# Patient Record
(demographics unavailable — no encounter records)

---

## 2018-11-28 NOTE — CT REPORT
Reason:  GROSS HEMATURIA

Procedure Date:  11/28/2018   

Accession Number:  [id number] / [id number]                    

Procedure:  CT  - IVP CPT Code:  

 

FULL RESULT:

 

 

EXAM:

CT ABDOMEN AND PELVIS WITHOUT AND WITH CONTRAST (CT IVP)

 

EXAM DATE: 11/28/2018 07:31 AM.

 

CLINICAL HISTORY: Gross hematuria.

 

COMPARISONS: None.

 

TECHNIQUE: Routine helical imaging was performed through the kidneys, 

ureters and bladder in the precontrast, postcontrast and delayed phase. 

IV Contrast: ISOVUE 320  100mL. Reconstructions: Coronal and sagittal.

 

In accordance with CT protocol optimization, one or more of the following 

dose reduction techniques were utilized for this exam: automated exposure 

control, adjustment of mA and/or KV based on patient size, or use of 

iterative reconstructive technique.

 

FINDINGS:

Lung Bases: Unremarkable.

 

Right Kidney/Ureter: 6 cm right upper pole simple renal cyst. No stones, 

hydronephrosis, or solid masses.

 

Left Kidney/Ureter: No stones, hydronephrosis, or masses.

 

Other Solid Organs: There are multiple bilateral hypodense liver lesions, 

the largest of which demonstrates An Irregular lobulated margin with an 

almost nodular appearance. While this is not diagnostic, this is a 

relatively typical appearance of hemangioma in the late contrast phase 

presented here. Other hepatic hypodensities are too small to 

characterize. The spleen, pancreas, gallbladder, and adrenal glands are 

unremarkable.The bile ducts are unremarkable.

 

Peritoneal Cavity/Bowel: Normal. No free fluid, free air or adenopathy. 

No masses.  Bowel loops are unremarkable.

 

Pelvic Organs: Fiducial markers are noted in the prostate. No bladder 

stones, obstruction or masses.

 

Vasculature: Mild atherosclerosis.

 

Bones: No aggressive osseous lesions.

 

Other: None.

IMPRESSION: Normal CT IVP. No suspicious urinary tract masses, stones or 

obstruction.

 

Uncharacterized hepatic hypodensities. For definitive characterization, 

CT multiphase liver protocol or MRI multiphase liver protocol can be 

considered.

 

[facility]

## 2019-03-20 NOTE — ULTRASOUND REPORT
Reason:  HISTORY OF NEPHROLITHIASIS

Procedure Date:  03/20/2019   

Accession Number:  [id number] / [id number]                    

Procedure:  US  - Retroperitoneal CPT Code:  

 

FULL RESULT:

 

 

EXAM:

RENAL ULTRASOUND

 

EXAM DATE: 3/20/2019 08:00 AM.

 

CLINICAL HISTORY: History of nephrolithiasis.

 

COMPARISON: IVP 11/28/2018 7:31 AM.

 

TECHNIQUE: Real-time scanning was performed with static images obtained.

 

FINDINGS:

Right Kidney: 12.1 x 5.5 x 4.9. cm. Normal echotexture with no stones, 

contour-deforming masses, or hydronephrosis. Exophytic thin-walled 

anechoic simple cyst measuring 7.9 cm off the superior pole, little 

changed compared to comparison CT.

 

Left Kidney: 11.8 x 5.2 x 5.6. cm. Normal echotexture with no 

contour-deforming masses or hydronephrosis. 1 cm exophytic 

simple-appearing cyst upper pole. 4 mm nonshadowing midpole stone.

 

Bladder: Bilateral jets seen. The prevoid bladder volume was 81.3 cc. The 

postvoid bladder volume was 59.5 cc.

 

Other: None.

IMPRESSION:

1. Stable appearance of large simple cyst of the upper pole right kidney.

2. Nonobstructive left mid pole nephrolithiasis.

3. Small to moderate postvoiding residual.

 

[facility]

## 2019-08-17 NOTE — XRAY REPORT
Reason:  KNEE PAIN,RT

Procedure Date:  08/14/2019   

Accession Number:  [id number] / [id number]                    

Procedure:  XR  - Knee 3 View RT CPT Code:  

 

FULL RESULT:

 

 

EXAM:

RIGHT KNEE RADIOGRAPHY

 

EXAM DATE: 8/14/2019 03:03 PM.

 

CLINICAL HISTORY: Right knee pain.

 

COMPARISON: None.

 

TECHNIQUE: 3 views.

 

FINDINGS:

Bones: Normal. No fractures or bone lesions.

 

Joints: Minimal spurring. No effusion. No subluxations.

 

Soft Tissues: Unremarkable.

IMPRESSION:

Mild osteoarthritis.  Kellgren Blair Grade 1.

 

Kellgren and Blair classification of osteoarthritis:

 

Grade 0: no radiographic features of osteoarthritis are present

Grade 1: doubtful joint space narrowing (JSN) and possible osteophytic 

lipping

Grade 2: definite osteophytes and possible JSN on anteroposterior 

weight-bearing radiograph

Grade 3: multiple osteophytes, definite JSN, sclerosis, possible bony 

deformity

Grade 4: large osteophytes, marked JSN, severe sclerosis and definite 

bony deformity

 

[facility]